# Patient Record
Sex: FEMALE | URBAN - METROPOLITAN AREA
[De-identification: names, ages, dates, MRNs, and addresses within clinical notes are randomized per-mention and may not be internally consistent; named-entity substitution may affect disease eponyms.]

---

## 2017-04-19 ENCOUNTER — TELEPHONE (OUTPATIENT)
Dept: TRANSPLANT | Facility: CLINIC | Age: 42
End: 2017-04-19

## 2017-04-19 NOTE — TELEPHONE ENCOUNTER
LM on patient's home phone requesting call back to discuss patient's post transplant care. Patient has not been seen at [facility] for 5 years.  Awaiting call back.

## 2018-06-13 ENCOUNTER — TELEPHONE (OUTPATIENT)
Dept: TRANSPLANT | Facility: CLINIC | Age: 43
End: 2018-06-13

## 2018-06-13 NOTE — TELEPHONE ENCOUNTER
Called pt to ask where she is doing her follow up care since she has not been seen here since 2012. States she was tx at [facility], transferred back to [facility] in 2013 because her insurance again covered her there - she only came to the  when her insurance didn't cover [facility].    Update appreciated. Wished her well!